# Patient Record
Sex: FEMALE | Race: WHITE | NOT HISPANIC OR LATINO | URBAN - METROPOLITAN AREA
[De-identification: names, ages, dates, MRNs, and addresses within clinical notes are randomized per-mention and may not be internally consistent; named-entity substitution may affect disease eponyms.]

---

## 2017-06-04 ENCOUNTER — EMERGENCY (EMERGENCY)
Facility: HOSPITAL | Age: 22
LOS: 0 days | Discharge: HOME | End: 2017-06-04

## 2017-06-28 DIAGNOSIS — Z79.899 OTHER LONG TERM (CURRENT) DRUG THERAPY: ICD-10-CM

## 2017-06-28 DIAGNOSIS — F41.8 OTHER SPECIFIED ANXIETY DISORDERS: ICD-10-CM

## 2017-06-28 DIAGNOSIS — Z76.0 ENCOUNTER FOR ISSUE OF REPEAT PRESCRIPTION: ICD-10-CM

## 2018-07-25 ENCOUNTER — EMERGENCY (EMERGENCY)
Facility: HOSPITAL | Age: 23
LOS: 0 days | Discharge: HOME | End: 2018-07-25
Attending: EMERGENCY MEDICINE | Admitting: EMERGENCY MEDICINE

## 2018-07-25 VITALS
TEMPERATURE: 97 F | DIASTOLIC BLOOD PRESSURE: 60 MMHG | HEART RATE: 88 BPM | RESPIRATION RATE: 19 BRPM | SYSTOLIC BLOOD PRESSURE: 122 MMHG | OXYGEN SATURATION: 99 %

## 2018-07-25 VITALS
SYSTOLIC BLOOD PRESSURE: 149 MMHG | OXYGEN SATURATION: 99 % | TEMPERATURE: 98 F | HEART RATE: 87 BPM | DIASTOLIC BLOOD PRESSURE: 67 MMHG | RESPIRATION RATE: 18 BRPM

## 2018-07-25 DIAGNOSIS — Z79.899 OTHER LONG TERM (CURRENT) DRUG THERAPY: ICD-10-CM

## 2018-07-25 DIAGNOSIS — R00.2 PALPITATIONS: ICD-10-CM

## 2018-07-25 DIAGNOSIS — R20.2 PARESTHESIA OF SKIN: ICD-10-CM

## 2018-07-25 DIAGNOSIS — F32.9 MAJOR DEPRESSIVE DISORDER, SINGLE EPISODE, UNSPECIFIED: ICD-10-CM

## 2018-07-25 DIAGNOSIS — R06.02 SHORTNESS OF BREATH: ICD-10-CM

## 2018-07-25 LAB
ANION GAP SERPL CALC-SCNC: 15 MMOL/L — HIGH (ref 7–14)
BUN SERPL-MCNC: 8 MG/DL — LOW (ref 10–20)
CALCIUM SERPL-MCNC: 9.6 MG/DL — SIGNIFICANT CHANGE UP (ref 8.5–10.1)
CHLORIDE SERPL-SCNC: 100 MMOL/L — SIGNIFICANT CHANGE UP (ref 98–110)
CO2 SERPL-SCNC: 24 MMOL/L — SIGNIFICANT CHANGE UP (ref 17–32)
CREAT SERPL-MCNC: 0.6 MG/DL — LOW (ref 0.7–1.5)
D DIMER BLD IA.RAPID-MCNC: 207 NG/ML DDU — SIGNIFICANT CHANGE UP (ref 0–230)
GLUCOSE SERPL-MCNC: 102 MG/DL — HIGH (ref 70–99)
HCT VFR BLD CALC: 39.9 % — SIGNIFICANT CHANGE UP (ref 37–47)
HGB BLD-MCNC: 12.7 G/DL — SIGNIFICANT CHANGE UP (ref 12–16)
MAGNESIUM SERPL-MCNC: 2.1 MG/DL — SIGNIFICANT CHANGE UP (ref 1.8–2.4)
MCHC RBC-ENTMCNC: 26.8 PG — LOW (ref 27–31)
MCHC RBC-ENTMCNC: 31.8 G/DL — LOW (ref 32–37)
MCV RBC AUTO: 84.2 FL — SIGNIFICANT CHANGE UP (ref 81–99)
NRBC # BLD: 0 /100 WBCS — SIGNIFICANT CHANGE UP (ref 0–0)
PLATELET # BLD AUTO: 333 K/UL — SIGNIFICANT CHANGE UP (ref 130–400)
POTASSIUM SERPL-MCNC: 4.3 MMOL/L — SIGNIFICANT CHANGE UP (ref 3.5–5)
POTASSIUM SERPL-SCNC: 4.3 MMOL/L — SIGNIFICANT CHANGE UP (ref 3.5–5)
RBC # BLD: 4.74 M/UL — SIGNIFICANT CHANGE UP (ref 4.2–5.4)
RBC # FLD: 13.5 % — SIGNIFICANT CHANGE UP (ref 11.5–14.5)
SODIUM SERPL-SCNC: 139 MMOL/L — SIGNIFICANT CHANGE UP (ref 135–146)
WBC # BLD: 8.5 K/UL — SIGNIFICANT CHANGE UP (ref 4.8–10.8)
WBC # FLD AUTO: 8.5 K/UL — SIGNIFICANT CHANGE UP (ref 4.8–10.8)

## 2018-07-25 RX ADMIN — Medication 60 MILLIGRAM(S): at 18:46

## 2018-07-25 NOTE — ED PROVIDER NOTE - NS ED ROS FT
Review of Systems:  	•	CONSTITUTIONAL - no fever, no diaphoresis, no chills  	•	SKIN - no rash  	  	•	EYES - no eye pain, no blurry vision    	•	RESPIRATORY - shortness of breath, no cough  	•	CARDIAC - no chest pain, no palpitations  	•	GI - no abd pain, no nausea, no vomiting, no diarrhea, no constipation  	•  	•	MUSCULOSKELETAL - no joint paint, no swelling, no redness  	•	NEUROLOGIC - no weakness, no headache, no paresthesias, no LOC  	•	PSYCH - anxiety, non suicidal, non homicidal, no hallucination, no depression

## 2018-07-25 NOTE — ED ADULT NURSE NOTE - FALL HARM RISK CONCLUSION
I will see this child at 3:20 PM right after the heil appointment  
Patient is running a slight fever and mom said there was a little girl at  that was diagnosed with influenza A last week.   Mom would like call back to see if patient could be seen today by Dr. Del Real - she believes patient has influenza.   
There are no openings today.  How do you want to proceed.   
Universal Safety Interventions

## 2018-07-25 NOTE — ED PROVIDER NOTE - ATTENDING CONTRIBUTION TO CARE
21 yo F h/o PCOS, anxiety presents with c/o feeling tingling of her lips, tightening of her throat after taking a Klonopin tonight.  Pt also has been feeling SOB and intermittent palpitations for the last few days, Started OCPs 2 weeks ago.  no CP, no abd pain.  On exam pt in NAD AAo x 3, OP clear, no lip or tongue swelling, no edema, no calf tenderness, Lungs cTA B/L no wrr, abd is soft nt nd

## 2018-07-25 NOTE — ED PROVIDER NOTE - PHYSICAL EXAMINATION
--EXAM--  VITAL SIGNS: I have reviewed vs documented at present.  CONSTITUTIONAL: Well-developed; well-nourished; in no acute distress.   SKIN: Warm and dry, no acute rash.     CARD: S1, S2, Regular rate and rhythm.   RESP: No wheezes, rales or rhonchi.  ABD: Normal bowel sounds; soft; non-distended; non-tender.  EXT: Normal ROM.   NEURO: Alert, oriented, grossly unremarkable. Strength 5/5 in all extremities. Sensation intact throughout.  PSYCH: Cooperative, appropriate.

## 2018-07-25 NOTE — ED ADULT TRIAGE NOTE - CHIEF COMPLAINT QUOTE
Per patient, "I took a Klonopin before and it feels like it si stuck in my throat. I have taken them in the past with no problem"

## 2018-11-28 ENCOUNTER — EMERGENCY (EMERGENCY)
Facility: HOSPITAL | Age: 23
LOS: 0 days | Discharge: HOME | End: 2018-11-28
Attending: EMERGENCY MEDICINE | Admitting: EMERGENCY MEDICINE

## 2018-11-28 VITALS
SYSTOLIC BLOOD PRESSURE: 146 MMHG | DIASTOLIC BLOOD PRESSURE: 83 MMHG | RESPIRATION RATE: 18 BRPM | WEIGHT: 293 LBS | TEMPERATURE: 98 F | HEART RATE: 116 BPM | OXYGEN SATURATION: 98 %

## 2018-11-28 DIAGNOSIS — F41.8 OTHER SPECIFIED ANXIETY DISORDERS: ICD-10-CM

## 2018-11-28 DIAGNOSIS — Z88.8 ALLERGY STATUS TO OTHER DRUGS, MEDICAMENTS AND BIOLOGICAL SUBSTANCES: ICD-10-CM

## 2018-11-28 DIAGNOSIS — Z79.899 OTHER LONG TERM (CURRENT) DRUG THERAPY: ICD-10-CM

## 2018-11-28 RX ORDER — ESCITALOPRAM OXALATE 10 MG/1
0 TABLET, FILM COATED ORAL
Qty: 0 | Refills: 0 | COMMUNITY

## 2018-11-28 RX ORDER — CLONAZEPAM 1 MG
0 TABLET ORAL
Qty: 0 | Refills: 0 | COMMUNITY

## 2018-11-28 RX ORDER — BUPROPION HYDROCHLORIDE 150 MG/1
0 TABLET, EXTENDED RELEASE ORAL
Qty: 0 | Refills: 0 | COMMUNITY

## 2018-11-28 NOTE — ED PROVIDER NOTE - NS ED ROS FT
Review of Systems:  	•	CONSTITUTIONAL - no fever, no diaphoresis, no chills  	•	SKIN - no rash  	•	HEMATOLOGIC - no bleeding, no bruising  	•	EYES - no eye pain, no blurry vision  	•	ENT - no congestion  	•	RESPIRATORY - no shortness of breath, no cough  	•	CARDIAC - no chest pain, no palpitations  	•	GI - no abd pain, no nausea, no vomiting  	•	NEUROLOGIC - no weakness, no headache, no paresthesias, no LOC  	•	PSYCH - +anxiety, non suicidal, non homicidal, no hallucination, +depression  	All other ROS are negative except as documented in HPI.

## 2018-11-28 NOTE — ED PROVIDER NOTE - PHYSICAL EXAMINATION
VITAL SIGNS: I have reviewed nursing notes and confirm.  CONSTITUTIONAL: Well-developed; well-nourished; in no acute distress.  SKIN: Skin exam is warm and dry, no acute rash.  HEAD: Normocephalic; atraumatic.  EYES: PERRL, EOM intact; conjunctiva and sclera clear.  ENT: No nasal discharge; airway clear.   NECK: Supple; non tender.  CARD: S1, S2 normal; no murmurs, gallops, or rubs. Regular rate and rhythm.  RESP: Clear to auscultation bilaterally. No wheezes, rales or rhonchi.  ABD: Normal bowel sounds; soft; non-distended; non-tender.   EXT: Normal ROM.   NEURO: Alert, oriented. Grossly unremarkable. No focal deficits.  PSYCH: Cooperative, appropriate.

## 2018-11-28 NOTE — ED PROVIDER NOTE - ATTENDING CONTRIBUTION TO CARE
24yo F presents requesting to be seen by psych. Pt states that she was seen by a new psychiatrist 2 weeks ago and was diagnosed with PTSD and Bipolar at which time she was started on 3 new medications. Pt states she feels like she is experiencing some side effects. Pt denies any CP, SOB, fever, chills, nausea, vomiting, SI/HI. On exam: NCAT. PERRLA, EOMI. OP clear. Lungs CTAB. RRR, S1S2 noted. Radial pulses 2+ and equal, pedal pulses 2+ and equal. Abdomen soft, NT/ND, no rebound or guarding. FROM x4 extremities. No focal neuro deficits. will consult with psych she is not homicidal or sucicidal

## 2018-11-28 NOTE — ED PROVIDER NOTE - OBJECTIVE STATEMENT
24 yo F with pmhx of depression, anxiety presenting for psych evaluation. Pt states she was seen by a new psychiatrist 2 weeks ago and was newly diagnosed with PTSD and bipolar disorder and started her on 3 new meds 2 wks ago in which she thinks are giving her side effects. No homicidal or suicidal ideations.

## 2018-11-28 NOTE — ED PROVIDER NOTE - MEDICAL DECISION MAKING DETAILS
22yo F presents requesting to be seen by psych. Pt states that she was seen by a new psychiatrist 2 weeks ago and was diagnosed with PTSD and Bipolar at which time she was started on 3 new medications. Pt states she feels like she is experiencing some side effects. Pt denies any CP, SOB, fever, chills, nausea, vomiting, SI/HI. On exam: NCAT. PERRLA, EOMI. OP clear. Lungs CTAB. RRR, S1S2 noted. Radial pulses 2+ and equal, pedal pulses 2+ and equal. Abdomen soft, NT/ND, no rebound or guarding. FROM x4 extremities. No focal neuro deficits. will consult with psych 22yo F presents requesting to be seen by psych. Pt states that she was seen by a new psychiatrist 2 weeks ago and was diagnosed with PTSD and Bipolar at which time she was started on 3 new medications. Pt states she feels like she is experiencing some side effects. Pt denies any CP, SOB, fever, chills, nausea, vomiting, SI/HI. On exam: NCAT. PERRLA, EOMI. OP clear. Lungs CTAB. RRR, S1S2 noted. Radial pulses 2+ and equal, pedal pulses 2+ and equal. Abdomen soft, NT/ND, no rebound or guarding. FROM x4 extremities. No focal neuro deficits. will consult with psych she is not homicidal or sucicidal

## 2018-11-28 NOTE — ED PROVIDER NOTE - NSFOLLOWUPINSTRUCTIONS_ED_ALL_ED_FT
Depression    Depression is a mental illness that usually causes feelings of sadness, hopelessness, or helplessness. Some people with this disorder do not feel particularly sad but lose interest in doing things they used to enjoy (anhedonia). Major depressive disorder also can cause physical symptoms. It can interfere with work, school, relationships, and other normal everyday activities. If you were started on a medication make sure to take exactly as prescribed and follow up with a psychiatrist.    SEEK IMMEDIATE MEDICAL CARE IF YOU HAVE THE FOLLOWING SYMPTOMS: thoughts about hurting killing yourself, thoughts about hurting or killing somebody else, hallucinations, or worsening depression.

## 2018-11-28 NOTE — ED ADULT NURSE NOTE - NSIMPLEMENTINTERV_GEN_ALL_ED
Implemented All Universal Safety Interventions:  [place] to call system. Call bell, personal items and telephone within reach. Instruct patient to call for assistance. Room bathroom lighting operational. Non-slip footwear when patient is off stretcher. Physically safe environment: no spills, clutter or unnecessary equipment. Stretcher in lowest position, wheels locked, appropriate side rails in place.

## 2018-11-28 NOTE — ED BEHAVIORAL HEALTH NOTE - BEHAVIORAL HEALTH NOTE
Pt is 23 yrs old single W/F, student at [employer], came to the ED requesting psychiatric evaluation after she was diagnosed to had Bipolar Disorder and PTSD and was prescribed, Ziprasidone 20 mg BID Clonidine 0.1 mg BID, and Seroquel 25 mg at HS. She has history of two psychiatric hospitalization when she was [age] yrs old; one at [facility] for depression and cutting herself and at [facility] for severe depression and suicidal behavior. She has been going to [facility] services where she sees a Psychiatrist and therapist weekly.  She is complaining that she has been feeling groggy, has difficulty of concentrating and her anxiety had increased since she started taking the combination of Ziprasidone, Clonidine, and Seroquel.   She has no history of alcohol or drug abuse.  She is allergic to Klonopin,, denies medical problem; LMP was two weeks ago. Her father is alcoholic and probably has depression and a Mater Uncle has history of drug abuse.  She denies any legal problem; presently reside with parents and sibling.   MSE: Pt is 23 yrs old W/F looks stated age, appropriately groomed and dressed, cooperative with good eye contact. She was pleasant and friendly; affect is appropriate. Speech is normal rate and volume, coherent and goal-directed. No delusion or a/v hallucination. Not suicidal or homicidal. Alert, oriented x 3 with good memory. Judgment and insight not impaired.     Dx: Bipolar Affective Disorder        PTSD  Plan: Pt report side effects of combination or present meds; Ziprasidone, Clonidine and Seroquel.          Ziprasidone 20 mg daily          She was advise to take Vistaril 50 mg which she has at home for her anxiety and sleeping difficulties.           D/C Seroquel as she claim it makes her groggy  with low dose of 25 mg at HS          F/U with [facility]. She claim she has an appt with her therapist this Friday.

## 2018-11-28 NOTE — ED PROVIDER NOTE - NSFOLLOWUPCLINICS_GEN_ALL_ED_FT
[facility]  [facility]  [address] [place] [id number]  Phone: [phone]  Fax:   Follow Up Time: 1-3 Days

## 2018-11-29 PROBLEM — F32.9 MAJOR DEPRESSIVE DISORDER, SINGLE EPISODE, UNSPECIFIED: Chronic | Status: ACTIVE | Noted: 2018-07-25

## 2019-04-03 NOTE — ED ADULT NURSE NOTE - NS ED BHA BENZODIAZEPINES
We want to give the best care possible. If you receive a Press Ganey survey from our office, please take the time to fill out the survey and return it in the envelope provided. Your feedback helps us know how we are doing and we really appreciate it.Thank you.    
None known

## 2020-09-17 ENCOUNTER — RX RENEWAL (OUTPATIENT)
Age: 25
End: 2020-09-17

## 2020-09-28 ENCOUNTER — EMERGENCY (EMERGENCY)
Facility: HOSPITAL | Age: 25
LOS: 0 days | Discharge: HOME | End: 2020-09-28
Attending: EMERGENCY MEDICINE | Admitting: EMERGENCY MEDICINE
Payer: COMMERCIAL

## 2020-09-28 VITALS
HEIGHT: 66 IN | WEIGHT: 293 LBS | DIASTOLIC BLOOD PRESSURE: 75 MMHG | TEMPERATURE: 98 F | OXYGEN SATURATION: 100 % | RESPIRATION RATE: 18 BRPM | HEART RATE: 83 BPM | SYSTOLIC BLOOD PRESSURE: 127 MMHG

## 2020-09-28 VITALS
DIASTOLIC BLOOD PRESSURE: 64 MMHG | TEMPERATURE: 98 F | HEART RATE: 71 BPM | OXYGEN SATURATION: 99 % | SYSTOLIC BLOOD PRESSURE: 131 MMHG | RESPIRATION RATE: 17 BRPM

## 2020-09-28 DIAGNOSIS — Z79.899 OTHER LONG TERM (CURRENT) DRUG THERAPY: ICD-10-CM

## 2020-09-28 DIAGNOSIS — R10.9 UNSPECIFIED ABDOMINAL PAIN: ICD-10-CM

## 2020-09-28 DIAGNOSIS — Z88.8 ALLERGY STATUS TO OTHER DRUGS, MEDICAMENTS AND BIOLOGICAL SUBSTANCES: ICD-10-CM

## 2020-09-28 DIAGNOSIS — R11.2 NAUSEA WITH VOMITING, UNSPECIFIED: ICD-10-CM

## 2020-09-28 DIAGNOSIS — F32.9 MAJOR DEPRESSIVE DISORDER, SINGLE EPISODE, UNSPECIFIED: ICD-10-CM

## 2020-09-28 DIAGNOSIS — R19.7 DIARRHEA, UNSPECIFIED: ICD-10-CM

## 2020-09-28 DIAGNOSIS — R06.02 SHORTNESS OF BREATH: ICD-10-CM

## 2020-09-28 LAB
ALBUMIN SERPL ELPH-MCNC: 4.3 G/DL — SIGNIFICANT CHANGE UP (ref 3.5–5.2)
ALP SERPL-CCNC: 94 U/L — SIGNIFICANT CHANGE UP (ref 30–115)
ALT FLD-CCNC: 28 U/L — SIGNIFICANT CHANGE UP (ref 0–41)
ANION GAP SERPL CALC-SCNC: 11 MMOL/L — SIGNIFICANT CHANGE UP (ref 7–14)
AST SERPL-CCNC: 30 U/L — SIGNIFICANT CHANGE UP (ref 0–41)
BASOPHILS # BLD AUTO: 0.04 K/UL — SIGNIFICANT CHANGE UP (ref 0–0.2)
BASOPHILS NFR BLD AUTO: 0.5 % — SIGNIFICANT CHANGE UP (ref 0–1)
BILIRUB SERPL-MCNC: 0.4 MG/DL — SIGNIFICANT CHANGE UP (ref 0.2–1.2)
BUN SERPL-MCNC: 11 MG/DL — SIGNIFICANT CHANGE UP (ref 10–20)
CALCIUM SERPL-MCNC: 9.4 MG/DL — SIGNIFICANT CHANGE UP (ref 8.5–10.1)
CHLORIDE SERPL-SCNC: 103 MMOL/L — SIGNIFICANT CHANGE UP (ref 98–110)
CO2 SERPL-SCNC: 27 MMOL/L — SIGNIFICANT CHANGE UP (ref 17–32)
CREAT SERPL-MCNC: 0.6 MG/DL — LOW (ref 0.7–1.5)
EOSINOPHIL # BLD AUTO: 0.15 K/UL — SIGNIFICANT CHANGE UP (ref 0–0.7)
EOSINOPHIL NFR BLD AUTO: 2.1 % — SIGNIFICANT CHANGE UP (ref 0–8)
GLUCOSE SERPL-MCNC: 123 MG/DL — HIGH (ref 70–99)
HCT VFR BLD CALC: 40.1 % — SIGNIFICANT CHANGE UP (ref 37–47)
HGB BLD-MCNC: 12.3 G/DL — SIGNIFICANT CHANGE UP (ref 12–16)
IMM GRANULOCYTES NFR BLD AUTO: 0.3 % — SIGNIFICANT CHANGE UP (ref 0.1–0.3)
LIDOCAIN IGE QN: 22 U/L — SIGNIFICANT CHANGE UP (ref 7–60)
LYMPHOCYTES # BLD AUTO: 1.99 K/UL — SIGNIFICANT CHANGE UP (ref 1.2–3.4)
LYMPHOCYTES # BLD AUTO: 27.3 % — SIGNIFICANT CHANGE UP (ref 20.5–51.1)
MCHC RBC-ENTMCNC: 26.7 PG — LOW (ref 27–31)
MCHC RBC-ENTMCNC: 30.7 G/DL — LOW (ref 32–37)
MCV RBC AUTO: 87 FL — SIGNIFICANT CHANGE UP (ref 81–99)
MONOCYTES # BLD AUTO: 0.5 K/UL — SIGNIFICANT CHANGE UP (ref 0.1–0.6)
MONOCYTES NFR BLD AUTO: 6.8 % — SIGNIFICANT CHANGE UP (ref 1.7–9.3)
NEUTROPHILS # BLD AUTO: 4.6 K/UL — SIGNIFICANT CHANGE UP (ref 1.4–6.5)
NEUTROPHILS NFR BLD AUTO: 63 % — SIGNIFICANT CHANGE UP (ref 42.2–75.2)
NRBC # BLD: 0 /100 WBCS — SIGNIFICANT CHANGE UP (ref 0–0)
PLATELET # BLD AUTO: 286 K/UL — SIGNIFICANT CHANGE UP (ref 130–400)
POTASSIUM SERPL-MCNC: 4.4 MMOL/L — SIGNIFICANT CHANGE UP (ref 3.5–5)
POTASSIUM SERPL-SCNC: 4.4 MMOL/L — SIGNIFICANT CHANGE UP (ref 3.5–5)
PROT SERPL-MCNC: 7 G/DL — SIGNIFICANT CHANGE UP (ref 6–8)
RBC # BLD: 4.61 M/UL — SIGNIFICANT CHANGE UP (ref 4.2–5.4)
RBC # FLD: 13.6 % — SIGNIFICANT CHANGE UP (ref 11.5–14.5)
SODIUM SERPL-SCNC: 141 MMOL/L — SIGNIFICANT CHANGE UP (ref 135–146)
WBC # BLD: 7.3 K/UL — SIGNIFICANT CHANGE UP (ref 4.8–10.8)
WBC # FLD AUTO: 7.3 K/UL — SIGNIFICANT CHANGE UP (ref 4.8–10.8)

## 2020-09-28 PROCEDURE — 76705 ECHO EXAM OF ABDOMEN: CPT | Mod: 26

## 2020-09-28 PROCEDURE — 99285 EMERGENCY DEPT VISIT HI MDM: CPT

## 2020-09-28 PROCEDURE — 71046 X-RAY EXAM CHEST 2 VIEWS: CPT | Mod: 26

## 2020-09-28 RX ORDER — ESCITALOPRAM OXALATE 10 MG/1
0 TABLET, FILM COATED ORAL
Qty: 0 | Refills: 0 | DISCHARGE

## 2020-09-28 RX ORDER — SODIUM CHLORIDE 9 MG/ML
1000 INJECTION, SOLUTION INTRAVENOUS ONCE
Refills: 0 | Status: COMPLETED | OUTPATIENT
Start: 2020-09-28 | End: 2020-09-28

## 2020-09-28 RX ORDER — QUETIAPINE FUMARATE 200 MG/1
0 TABLET, FILM COATED ORAL
Qty: 0 | Refills: 0 | DISCHARGE

## 2020-09-28 RX ORDER — TRAZODONE HCL 50 MG
0 TABLET ORAL
Qty: 0 | Refills: 0 | DISCHARGE

## 2020-09-28 RX ORDER — LAMOTRIGINE 25 MG/1
0 TABLET, ORALLY DISINTEGRATING ORAL
Qty: 0 | Refills: 0 | DISCHARGE

## 2020-09-28 RX ORDER — DIPHENHYDRAMINE HYDROCHLORIDE AND LIDOCAINE HYDROCHLORIDE AND ALUMINUM HYDROXIDE AND MAGNESIUM HYDRO
30 KIT ONCE
Refills: 0 | Status: COMPLETED | OUTPATIENT
Start: 2020-09-28 | End: 2020-09-28

## 2020-09-28 RX ORDER — ONDANSETRON 8 MG/1
1 TABLET, FILM COATED ORAL
Qty: 9 | Refills: 0
Start: 2020-09-28 | End: 2020-09-30

## 2020-09-28 RX ORDER — ONDANSETRON 8 MG/1
4 TABLET, FILM COATED ORAL ONCE
Refills: 0 | Status: COMPLETED | OUTPATIENT
Start: 2020-09-28 | End: 2020-09-28

## 2020-09-28 RX ORDER — ZIPRASIDONE HYDROCHLORIDE 20 MG/1
1 CAPSULE ORAL
Qty: 0 | Refills: 0 | DISCHARGE

## 2020-09-28 RX ADMIN — SODIUM CHLORIDE 1000 MILLILITER(S): 9 INJECTION, SOLUTION INTRAVENOUS at 14:01

## 2020-09-28 RX ADMIN — SODIUM CHLORIDE 1000 MILLILITER(S): 9 INJECTION, SOLUTION INTRAVENOUS at 11:50

## 2020-09-28 RX ADMIN — ONDANSETRON 4 MILLIGRAM(S): 8 TABLET, FILM COATED ORAL at 11:39

## 2020-09-28 RX ADMIN — DIPHENHYDRAMINE HYDROCHLORIDE AND LIDOCAINE HYDROCHLORIDE AND ALUMINUM HYDROXIDE AND MAGNESIUM HYDRO 30 MILLILITER(S): KIT at 11:39

## 2020-09-28 RX ADMIN — Medication 20 MILLIGRAM(S): at 14:09

## 2020-09-28 NOTE — ED PROVIDER NOTE - PHYSICAL EXAMINATION
Vital Signs: I have reviewed the initial vital signs.  Constitutional: well-nourished, appears stated age, no acute distress  Cardiovascular: regular rate, regular rhythm, well-perfused extremities  Respiratory: unlabored respiratory effort, clear to auscultation bilaterally  Gastrointestinal: soft, (+) epigastric region ttp. No CVA tenderness.    Musculoskeletal: supple neck, no lower extremity edema  Integumentary: warm, dry, no rash  Neurologic: awake, alert, extremities’ motor and sensory functions grossly intact  Psychiatric: appropriate mood, appropriate affect

## 2020-09-28 NOTE — ED PROVIDER NOTE - CLINICAL SUMMARY MEDICAL DECISION MAKING FREE TEXT BOX
24y female no significant PMH currently menstruating c/o crampy upper abdominal pain with n/v and 1-2 episodes watery nb stool, no f/c, no recent abx use, concerned as she was participating in chicken rescue in [place] these last 2 days where she was amidst dead chickens/feces/blood, was in PPE, on exam vital signs appreciated, well appearing conj pink mmm neck supple cor reg lungs cta abd +bs, soft with mild epi ttp no g/r, pt with initial relief then return of pain prompting sono, unremarkable, pt feeling better, abd snt, po tolerant, will d/c to f/u with PMD, aware of limitations of todays workup. Patient counseled regarding conditions which should prompt return.

## 2020-09-28 NOTE — ED PROVIDER NOTE - NSFOLLOWUPCLINICS_GEN_ALL_ED_FT
[facility]  Medicine  [address], [place]   Phone: [phone]  Fax:   Follow Up Time: 1-3 Days

## 2020-09-28 NOTE — ED PROVIDER NOTE - NS ED ROS FT
Constitutional: (-) fever  Eyes/ENT: (-) blurry vision, (-) epistaxis  Cardiovascular: (-) chest pain, (-) syncope  Respiratory: (-) cough, (+) shortness of breath  Gastrointestinal: (+) vomiting, (+) epigastric pain, (-) diarrhea  Musculoskeletal: (-) neck pain, (-) back pain, (-) joint pain  Integumentary: (-) rash, (-) edema  Neurological: (-) headache, (-) altered mental status  Psychiatric: (-) hallucinations  Allergic/Immunologic: (-) pruritus

## 2020-09-28 NOTE — ED PROVIDER NOTE - OBJECTIVE STATEMENT
The pt is a 24y F w/ hx of obesity presenting with N/V that started this morning. Pt was just on a work excursion in [place] and awake for 48 hours, felt fine throughout. When she woke up this morning she had breakfast and shortly after had 1 episode of NBNB emesis. After emesis, felt sharp pain in the epigastric region associated with mild SOB. No sick contacts, other recent travel. Afebrile. Denies headache, chest pain, diarrhea, dysuria/hematuria. Menstrual cycles are not regular. She says she is currently on her period and it has been present for about 1 month. Prior to this, she hadn't had a period in several months.

## 2020-09-28 NOTE — ED PROVIDER NOTE - PATIENT PORTAL LINK FT
You can access the FollowMyHealth Patient Portal offered by [facility] by registering at the following website: [email or web address]. By joining [facility]’s FollowMyHealth portal, you will also be able to view your health information using other applications (apps) compatible with our system.

## 2020-09-28 NOTE — ED PROVIDER NOTE - ATTENDING CONTRIBUTION TO CARE
24y female no significant PMH currently menstruating c/o crampy upper abdominal pain with n/v and 1-2 episodes watery nb stool, no f/c, no recent abx use, concerned as she was participating in chicken rescue in [place] these last 2 days where she was amidst dead chickens/feces/blood, was in PPE, on exam vital signs appreciated, well appearing conj pink mmm neck supple cor reg lungs cta abd +bs, soft with mild epi ttp no g/r, pt with initial relief then return of pain prompting sono, unremarkable, pt feeling better, abd snt, po tolerant, will d/c to f/u with PMD, aware of limitations of todays workup. Patient counseled regarding conditions which should prompt return.

## 2022-05-04 ENCOUNTER — NURSE TRIAGE (OUTPATIENT)
Dept: ADMINISTRATIVE | Facility: CLINIC | Age: 27
End: 2022-05-04

## 2022-05-04 PROCEDURE — 81025 URINE PREGNANCY TEST: CPT | Performed by: EMERGENCY MEDICINE

## 2022-05-04 PROCEDURE — 99285 EMERGENCY DEPT VISIT HI MDM: CPT | Mod: 25

## 2022-05-05 ENCOUNTER — HOSPITAL ENCOUNTER (EMERGENCY)
Facility: OTHER | Age: 27
Discharge: HOME | End: 2022-05-05
Attending: EMERGENCY MEDICINE
Payer: MEDICAID

## 2022-05-05 VITALS
OXYGEN SATURATION: 100 % | TEMPERATURE: 98 F | HEART RATE: 85 BPM | RESPIRATION RATE: 17 BRPM | WEIGHT: 240 LBS | DIASTOLIC BLOOD PRESSURE: 69 MMHG | SYSTOLIC BLOOD PRESSURE: 134 MMHG

## 2022-05-05 DIAGNOSIS — R10.11 ACUTE BILATERAL UPPER ABDOMINAL PAIN: ICD-10-CM

## 2022-05-05 DIAGNOSIS — R10.12 ACUTE BILATERAL UPPER ABDOMINAL PAIN: ICD-10-CM

## 2022-05-05 DIAGNOSIS — K59.00 CONSTIPATION, UNSPECIFIED CONSTIPATION TYPE: Primary | ICD-10-CM

## 2022-05-05 LAB
ALBUMIN SERPL BCP-MCNC: 4 G/DL (ref 3.5–5.2)
ALP SERPL-CCNC: 78 U/L (ref 55–135)
ALT SERPL W/O P-5'-P-CCNC: 18 U/L (ref 10–44)
ANION GAP SERPL CALC-SCNC: 12 MMOL/L (ref 8–16)
AST SERPL-CCNC: 17 U/L (ref 10–40)
B-HCG UR QL: NEGATIVE
BASOPHILS # BLD AUTO: 0.03 K/UL (ref 0–0.2)
BASOPHILS NFR BLD: 0.3 % (ref 0–1.9)
BILIRUB SERPL-MCNC: 0.4 MG/DL (ref 0.1–1)
BILIRUB UR QL STRIP: NEGATIVE
BUN SERPL-MCNC: 13 MG/DL (ref 6–20)
CALCIUM SERPL-MCNC: 9.1 MG/DL (ref 8.7–10.5)
CHLORIDE SERPL-SCNC: 99 MMOL/L (ref 95–110)
CLARITY UR: CLEAR
CO2 SERPL-SCNC: 24 MMOL/L (ref 23–29)
COLOR UR: YELLOW
CREAT SERPL-MCNC: 0.7 MG/DL (ref 0.5–1.4)
CTP QC/QA: YES
DIFFERENTIAL METHOD: ABNORMAL
EOSINOPHIL # BLD AUTO: 0 K/UL (ref 0–0.5)
EOSINOPHIL NFR BLD: 0.2 % (ref 0–8)
ERYTHROCYTE [DISTWIDTH] IN BLOOD BY AUTOMATED COUNT: 14.4 % (ref 11.5–14.5)
EST. GFR  (AFRICAN AMERICAN): >60 ML/MIN/1.73 M^2
EST. GFR  (NON AFRICAN AMERICAN): >60 ML/MIN/1.73 M^2
GLUCOSE SERPL-MCNC: 137 MG/DL (ref 70–110)
GLUCOSE UR QL STRIP: NEGATIVE
HCT VFR BLD AUTO: 40.1 % (ref 37–48.5)
HCV AB SERPL QL IA: NEGATIVE
HGB BLD-MCNC: 12.5 G/DL (ref 12–16)
HGB UR QL STRIP: NEGATIVE
HIV 1+2 AB+HIV1 P24 AG SERPL QL IA: NEGATIVE
IMM GRANULOCYTES # BLD AUTO: 0.03 K/UL (ref 0–0.04)
IMM GRANULOCYTES NFR BLD AUTO: 0.3 % (ref 0–0.5)
KETONES UR QL STRIP: NEGATIVE
LEUKOCYTE ESTERASE UR QL STRIP: NEGATIVE
LIPASE SERPL-CCNC: 9 U/L (ref 4–60)
LYMPHOCYTES # BLD AUTO: 1 K/UL (ref 1–4.8)
LYMPHOCYTES NFR BLD: 10.6 % (ref 18–48)
MCH RBC QN AUTO: 25.8 PG (ref 27–31)
MCHC RBC AUTO-ENTMCNC: 31.2 G/DL (ref 32–36)
MCV RBC AUTO: 83 FL (ref 82–98)
MONOCYTES # BLD AUTO: 0.4 K/UL (ref 0.3–1)
MONOCYTES NFR BLD: 4 % (ref 4–15)
NEUTROPHILS # BLD AUTO: 7.7 K/UL (ref 1.8–7.7)
NEUTROPHILS NFR BLD: 84.6 % (ref 38–73)
NITRITE UR QL STRIP: NEGATIVE
NRBC BLD-RTO: 0 /100 WBC
PH UR STRIP: 7 [PH] (ref 5–8)
PLATELET # BLD AUTO: 297 K/UL (ref 150–450)
PMV BLD AUTO: 11 FL (ref 9.2–12.9)
POTASSIUM SERPL-SCNC: 4.1 MMOL/L (ref 3.5–5.1)
PROT SERPL-MCNC: 7.1 G/DL (ref 6–8.4)
PROT UR QL STRIP: NEGATIVE
RBC # BLD AUTO: 4.85 M/UL (ref 4–5.4)
SODIUM SERPL-SCNC: 135 MMOL/L (ref 136–145)
SP GR UR STRIP: 1.02 (ref 1–1.03)
URN SPEC COLLECT METH UR: NORMAL
UROBILINOGEN UR STRIP-ACNC: NEGATIVE EU/DL
WBC # BLD AUTO: 9.08 K/UL (ref 3.9–12.7)

## 2022-05-05 PROCEDURE — 87389 HIV-1 AG W/HIV-1&-2 AB AG IA: CPT | Performed by: EMERGENCY MEDICINE

## 2022-05-05 PROCEDURE — 96375 TX/PRO/DX INJ NEW DRUG ADDON: CPT

## 2022-05-05 PROCEDURE — 83690 ASSAY OF LIPASE: CPT | Performed by: EMERGENCY MEDICINE

## 2022-05-05 PROCEDURE — 85025 COMPLETE CBC W/AUTO DIFF WBC: CPT | Performed by: EMERGENCY MEDICINE

## 2022-05-05 PROCEDURE — 86803 HEPATITIS C AB TEST: CPT | Performed by: EMERGENCY MEDICINE

## 2022-05-05 PROCEDURE — 96361 HYDRATE IV INFUSION ADD-ON: CPT

## 2022-05-05 PROCEDURE — 25000003 PHARM REV CODE 250: Performed by: EMERGENCY MEDICINE

## 2022-05-05 PROCEDURE — 96374 THER/PROPH/DIAG INJ IV PUSH: CPT | Mod: 59

## 2022-05-05 PROCEDURE — 25500020 PHARM REV CODE 255: Performed by: EMERGENCY MEDICINE

## 2022-05-05 PROCEDURE — 80053 COMPREHEN METABOLIC PANEL: CPT | Performed by: EMERGENCY MEDICINE

## 2022-05-05 PROCEDURE — 81003 URINALYSIS AUTO W/O SCOPE: CPT | Performed by: EMERGENCY MEDICINE

## 2022-05-05 PROCEDURE — 63600175 PHARM REV CODE 636 W HCPCS: Performed by: EMERGENCY MEDICINE

## 2022-05-05 RX ORDER — HYDROMORPHONE HYDROCHLORIDE 1 MG/ML
0.5 INJECTION, SOLUTION INTRAMUSCULAR; INTRAVENOUS; SUBCUTANEOUS
Status: COMPLETED | OUTPATIENT
Start: 2022-05-05 | End: 2022-05-05

## 2022-05-05 RX ORDER — NAPROXEN 500 MG/1
500 TABLET ORAL 2 TIMES DAILY WITH MEALS
Qty: 14 TABLET | Refills: 0 | Status: SHIPPED | OUTPATIENT
Start: 2022-05-05 | End: 2022-05-12

## 2022-05-05 RX ORDER — FAMOTIDINE 10 MG/ML
20 INJECTION INTRAVENOUS
Status: COMPLETED | OUTPATIENT
Start: 2022-05-05 | End: 2022-05-05

## 2022-05-05 RX ORDER — SODIUM CHLORIDE 9 MG/ML
1000 INJECTION, SOLUTION INTRAVENOUS
Status: COMPLETED | OUTPATIENT
Start: 2022-05-05 | End: 2022-05-05

## 2022-05-05 RX ORDER — ONDANSETRON 8 MG/1
8 TABLET, ORALLY DISINTEGRATING ORAL EVERY 6 HOURS PRN
Qty: 15 TABLET | Refills: 0 | Status: SHIPPED | OUTPATIENT
Start: 2022-05-05

## 2022-05-05 RX ADMIN — SODIUM CHLORIDE 1000 ML: 0.9 INJECTION, SOLUTION INTRAVENOUS at 01:05

## 2022-05-05 RX ADMIN — HYDROMORPHONE HYDROCHLORIDE 0.5 MG: 1 INJECTION, SOLUTION INTRAMUSCULAR; INTRAVENOUS; SUBCUTANEOUS at 01:05

## 2022-05-05 RX ADMIN — FAMOTIDINE 20 MG: 10 INJECTION INTRAVENOUS at 01:05

## 2022-05-05 RX ADMIN — IOHEXOL 100 ML: 350 INJECTION, SOLUTION INTRAVENOUS at 02:05

## 2022-05-05 NOTE — TELEPHONE ENCOUNTER
Caller c/o Abd pain 7/8, vomiting x 5-10 episodes and diarrhea x 5-10 episodes x 2 hours.  Pt advised per protocol and verbalized understanding.    Reason for Disposition   [1] SEVERE pain (e.g., excruciating) AND [2] present > 1 hour    Additional Information   Negative: Shock suspected (e.g., cold/pale/clammy skin, too weak to stand, low BP, rapid pulse)   Negative: Difficult to awaken or acting confused (e.g., disoriented, slurred speech)   Negative: Passed out (i.e., lost consciousness, collapsed and was not responding)   Negative: Sounds like a life-threatening emergency to the triager    Protocols used: ABDOMINAL PAIN - FEMALE-A-AH

## 2022-05-05 NOTE — ED NOTES
Pt arrives to Ed with c/o abdominal pain, nausea, vomitting with onset yesterday. Pt lying in bed, respirations even, unlabored, NAD noted, answering questions appropriately.

## 2022-05-05 NOTE — ED PROVIDER NOTES
Encounter Date: 5/4/2022       History     Chief Complaint   Patient presents with    Abdominal Pain     Epigastric pain with onset yesterday, N/V       26-year-old female with no known past medical history presents complaining of periumbilical pain that has been intermittent since yesterday.  She describes the pain as a dull twisting pain that resolved last night and recurred after eating lunch this afternoon.  She reports associated nausea and vomiting and diarrhea.  She denies any fever, chills, myalgias, or urinary symptoms.  She denies history of pancreatitis, cholelithiasis, gastritis peptic ulcer disease.  She denies any interventions at home.  She denies any previous abdominal surgeries.  She denies any similar pain in the past.        Review of patient's allergies indicates:  No Known Allergies  No past medical history on file.  No past surgical history on file.  No family history on file.     Review of Systems   Constitutional: Negative for chills, fatigue and fever.   Respiratory: Negative for cough and shortness of breath.    Cardiovascular: Negative for chest pain.   Gastrointestinal: Positive for abdominal pain, diarrhea, nausea and vomiting.   Genitourinary: Negative for dysuria, flank pain and frequency.   Musculoskeletal: Negative for back pain and myalgias.   Neurological: Negative for dizziness, weakness and headaches.   All other systems reviewed and are negative.      Physical Exam     Initial Vitals   BP Pulse Resp Temp SpO2   05/04/22 2336 05/04/22 2336 05/04/22 2336 05/04/22 2337 05/04/22 2336   (!) 113/97 78 18 97.8 °F (36.6 °C) 100 %      MAP       --                Physical Exam    Nursing note and vitals reviewed.  Constitutional: She appears well-developed and well-nourished. She is not diaphoretic. No distress.   HENT:   Head: Normocephalic and atraumatic.   Right Ear: External ear normal.   Left Ear: External ear normal.   Nose: Nose normal.   Mouth/Throat: No oropharyngeal exudate.    Eyes: Conjunctivae and EOM are normal. Right eye exhibits no discharge. Left eye exhibits no discharge. No scleral icterus.   Neck: Neck supple. No JVD present.   Normal range of motion.  Cardiovascular: Normal rate, regular rhythm and normal heart sounds. Exam reveals no friction rub.    No murmur heard.  Pulmonary/Chest: Breath sounds normal. No stridor. No respiratory distress. She has no wheezes. She has no rhonchi. She has no rales.   Abdominal: Abdomen is soft. Bowel sounds are normal. She exhibits no distension.   RUQ and epigastric TTP There is no rebound and no guarding.   Musculoskeletal:         General: Normal range of motion.      Cervical back: Normal range of motion and neck supple.     Neurological: She is alert and oriented to person, place, and time. She has normal strength. GCS score is 15. GCS eye subscore is 4. GCS verbal subscore is 5. GCS motor subscore is 6.   Psychiatric: She has a normal mood and affect. Thought content normal.         ED Course   Procedures  Labs Reviewed   CBC W/ AUTO DIFFERENTIAL - Abnormal; Notable for the following components:       Result Value    MCH 25.8 (*)     MCHC 31.2 (*)     Gran % 84.6 (*)     Lymph % 10.6 (*)     All other components within normal limits   COMPREHENSIVE METABOLIC PANEL - Abnormal; Notable for the following components:    Sodium 135 (*)     Glucose 137 (*)     All other components within normal limits   HIV 1 / 2 ANTIBODY    Narrative:     Release to patient->Immediate   HEPATITIS C ANTIBODY    Narrative:     Release to patient->Immediate   URINALYSIS, REFLEX TO URINE CULTURE    Narrative:     Specimen Source->Urine   LIPASE   POCT URINE PREGNANCY          Imaging Results          CT Abdomen Pelvis With Contrast (Final result)  Result time 05/05/22 05:07:19    Final result by Diana Cody MD (05/05/22 05:07:19)                 Impression:      1. Mild hepatosplenomegaly.  2. Normal and prominent mesenteric lymph nodes most pronounced in  the right lower quadrant.  Findings can be seen with mesenteric adenitis in the appropriate clinical setting.  Clinical correlation advised.  3. Moderate volume of fecal material in the right and transverse colon.  Fecalization of distal small bowel contents which can be seen with delayed transit/incompetent ileocecal valve.  4. Additional findings as discussed above.      Electronically signed by: Diana Cody MD  Date:    05/05/2022  Time:    05:07             Narrative:    EXAMINATION:  CT ABDOMEN PELVIS WITH CONTRAST    CLINICAL HISTORY:  Epigastric pain;    TECHNIQUE:  Low dose axial images, sagittal and coronal reformations were obtained from the lung bases to the pubic symphysis following the IV administration of 100 mL of Omnipaque 350 .  Oral contrast was not given.    COMPARISON:  Ultrasound 05/05/2022    FINDINGS:  The visualized lung bases are free of pleural fluid or focal consolidation.  The visualized portions of the heart and pericardium are within normal limits.    The liver is prominent in size without focal abnormality.  No calcified stones are identified in the gallbladder lumen.  No significant intra or extrahepatic biliary ductal dilatation.  The spleen is prominent in size.  The pancreas demonstrates no abnormal adjacent inflammatory change or peripancreatic fluid.  The adrenal glands are unremarkable.    The kidneys are normal in size and location and enhance symmetrically.  There is subcentimeter left renal cortical hypodensities too small to definitively characterize.  There is no hydronephrosis.  The urinary bladder is distended with smooth margins.  The uterus and adnexal regions are within normal limits of in a patient of this chronologic age.    The abdominal aorta is nonaneurysmal.  There are shotty periaortic lymph nodes present.  Additionally there are normal and prominent mesenteric lymph nodes present in the right lower quadrant.    The stomach is nondistended.  The visualized  loops of large and small bowel demonstrate no evidence of obstruction or inflammatory change.  There is fecalization of small bowel contents in the terminal ileum/distal ileum which can be seen with delayed transit/incompetent ileocecal valve.  There is moderate volume of fecal material in the right and transverse colon.  The appendix is unremarkable.  There is no free intraperitoneal air, portal venous gas or ascites.    The visualized osseous structures are intact.  There is a small fat containing umbilical hernia.                               US Abdomen Limited (Final result)  Result time 05/05/22 02:10:00    Final result by Diana Cody MD (05/05/22 02:10:00)                 Impression:      1. No sonographic evidence of discrete cholelithiasis.  2. Mild hepatomegaly.  Findings suggestive of possible hepatic steatosis.  Correlation with LFTs recommended.      Electronically signed by: Diana Cody MD  Date:    05/05/2022  Time:    02:10             Narrative:    EXAMINATION:  US ABDOMEN LIMITED    CLINICAL HISTORY:  ruq pain;    TECHNIQUE:  Limited ultrasound of the right upper quadrant of the abdomen (including pancreas, liver, gallbladder, common bile duct, and spleen) was performed.    COMPARISON:  None.    FINDINGS:  Liver: The liver measures 18.8 cm.  There is slight diffuse attenuation of sound by the liver which can be seen with a diffuse parenchymal process such as fatty infiltration.   No focal hepatic lesions.    Gallbladder: No calculi, wall thickening, or pericholecystic fluid.  No sonographic Frederick's sign.    Biliary system: The common duct is not dilated, measuring 3 mm.  No intrahepatic ductal dilatation.    Spleen: Upper limit of normal size measuring 12.5 cm.    Pancreas: Partially obscured by bowel gas artifact.  Unremarkable in its visualized extent.    IVC: Unremarkable in its visualized extent.    Miscellaneous: No upper abdominal ascites.                                 Medications    0.9%  NaCl infusion (0 mLs Intravenous Stopped 5/5/22 0229)   famotidine (PF) injection 20 mg (20 mg Intravenous Given 5/5/22 0129)   HYDROmorphone injection 0.5 mg (0.5 mg Intravenous Given 5/5/22 0123)   iohexoL (OMNIPAQUE 350) injection 100 mL (100 mLs Intravenous Given 5/5/22 0258)                 ED Course as of 05/05/22 0523   Thu May 05, 2022   0221  On reassessment the patient states her pain is returning after having the ultrasound.  Ultrasound shows no evidence of an acute process.  Labs showed no significant abnormalities.    On repeat abdominal exam she continues to have epigastric  and now left upper quadrant rather than right upper quadrant pain.  Will obtain CT abdomen pelvis for further evaluation. [AA]      ED Course User Index  [AA] Jaqueline Chatman MD             Clinical Impression:   Final diagnoses:  [K59.00] Constipation, unspecified constipation type (Primary)  [R10.11, R10.12] Acute bilateral upper abdominal pain          ED Disposition Condition    Discharge Stable        ED Prescriptions     Medication Sig Dispense Start Date End Date Auth. Provider    ondansetron (ZOFRAN-ODT) 8 MG TbDL Take 1 tablet (8 mg total) by mouth every 6 (six) hours as needed (Nausea). 15 tablet 5/5/2022  Jaqueline Chatman MD    naproxen (NAPROSYN) 500 MG tablet Take 1 tablet (500 mg total) by mouth 2 (two) times daily with meals. for 7 days 14 tablet 5/5/2022 5/12/2022 Jaqueline Chatman MD        Follow-up Information     Follow up With Specialties Details Why Contact Info    [facility] Emergency Dept Emergency Medicine Go to  If symptoms worsen [address]  [facility] [id number] [phone]    Primary care  Schedule an appointment as soon as possible for a visit              Jaqueline Chatman MD  05/05/22 0523

## 2022-05-05 NOTE — DISCHARGE INSTRUCTIONS
Take Zofran as needed for nausea.  Take naproxen as needed for pain.  follow up with her primary care doctor for further evaluation if this pain persists.  Seek immediate re-evaluation if you develop any new or worsening pain, vomiting despite Zofran, fever, or have any other concerns.

## 2023-12-19 ENCOUNTER — EMERGENCY (EMERGENCY)
Facility: HOSPITAL | Age: 28
LOS: 0 days | Discharge: HOME | End: 2023-12-19
Attending: STUDENT IN AN ORGANIZED HEALTH CARE EDUCATION/TRAINING PROGRAM
Payer: MEDICAID

## 2023-12-19 VITALS
TEMPERATURE: 99 F | SYSTOLIC BLOOD PRESSURE: 154 MMHG | WEIGHT: 293 LBS | RESPIRATION RATE: 18 BRPM | OXYGEN SATURATION: 98 % | DIASTOLIC BLOOD PRESSURE: 67 MMHG | HEIGHT: 67 IN | HEART RATE: 91 BPM

## 2023-12-19 DIAGNOSIS — Z76.0 ENCOUNTER FOR ISSUE OF REPEAT PRESCRIPTION: ICD-10-CM

## 2023-12-19 DIAGNOSIS — F31.9 BIPOLAR DISORDER, UNSPECIFIED: ICD-10-CM

## 2023-12-19 DIAGNOSIS — Z88.8 ALLERGY STATUS TO OTHER DRUGS, MEDICAMENTS AND BIOLOGICAL SUBSTANCES: ICD-10-CM

## 2023-12-19 PROCEDURE — 99283 EMERGENCY DEPT VISIT LOW MDM: CPT

## 2023-12-19 PROCEDURE — 99281 EMR DPT VST MAYX REQ PHY/QHP: CPT

## 2023-12-19 RX ORDER — ESCITALOPRAM OXALATE 10 MG/1
1 TABLET, FILM COATED ORAL
Qty: 7 | Refills: 0
Start: 2023-12-19 | End: 2023-12-25

## 2023-12-19 RX ORDER — LAMOTRIGINE 25 MG/1
1 TABLET, ORALLY DISINTEGRATING ORAL
Qty: 7 | Refills: 0
Start: 2023-12-19 | End: 2023-12-25

## 2023-12-19 RX ORDER — ARIPIPRAZOLE 15 MG/1
1 TABLET ORAL
Qty: 7 | Refills: 0
Start: 2023-12-19 | End: 2023-12-25

## 2023-12-19 NOTE — ED PROVIDER NOTE - CARE PROVIDERS DIRECT ADDRESSES
,[email or web address]

## 2023-12-19 NOTE — ED PROVIDER NOTE - OBJECTIVE STATEMENT
27yo F   PMH : Bipolar   Presents for medication refill  [place] and psychiatrist refused to refill psychiatric medications because has not seen patient in 3mo   patient taking lamotrigine 200 qd , aripiprazole 5mg qd and Escitalopram 20mg qd   Patient taking medication daily and ran out yesterday   Currently assymptomatic , no suicidal or homacidal ideation 29yo F   PMH : Bipolar   Presents for medication refill  [place] and psychiatrist refused to refill psychiatric medications because has not seen patient in 3mo   patient taking lamotrigine 200 qd , aripiprazole 5mg qd and Escitalopram 20mg qd   Patient taking medication daily and ran out yesterday   Currently assymptomatic , no suicidal or homacidal ideation

## 2023-12-19 NOTE — ED PROVIDER NOTE - NSFOLLOWUPCLINICS_GEN_ALL_ED_FT
[facility]  OP Mental Health  [address] [place] [address]  Phone: [phone]  Fax:      [facility]  OP Mental Health  [address] [place] [address]  Phone: [phone]  Fax:

## 2023-12-19 NOTE — ED ADULT NURSE NOTE - NSFALLUNIVINTERV_ED_ALL_ED
Bed/Stretcher in lowest position, wheels locked, appropriate side rails in place/Call bell, personal items and telephone in reach/Instruct patient to call for assistance before getting out of bed/chair/stretcher/Non-slip footwear applied when patient is off stretcher/[place] to call system/Physically safe environment - no spills, clutter or unnecessary equipment/Purposeful proactive rounding/Room/bathroom lighting operational, light cord in reach Bed/Stretcher in lowest position, wheels locked, appropriate side rails in place/Call bell, personal items and telephone in reach/Instruct patient to call for assistance before getting out of bed/chair/stretcher/Non-slip footwear applied when patient is off stretcher/[place] to call system/Physically safe environment - no spills, clutter or unnecessary equipment/Purposeful proactive rounding/Room/bathroom lighting operational, light cord in reach

## 2023-12-19 NOTE — ED ADULT NURSE NOTE - BREATHING, MLM
Telephone Encounter by Ashlee George RN at 05/24/17 04:45 PM     Author:  Ashlee George RN Service:  (none) Author Type:  Registered Nurse     Filed:  05/24/17 04:45 PM Encounter Date:  5/22/2017 Status:  Signed     :  Ashlee George RN (Registered Nurse)            Appointment made with Dr. Cabrera for June 1, 2017.[NW1.1M] Electronically Signed by:    Ashlee George RN , 5/24/2017[NW1.1T]        Revision History        User Key Date/Time User Provider Type Action    > NW1.1 05/24/17 04:45 PM Ashlee George RN Registered Nurse Sign    M - Manual, T - Template            
Telephone Encounter by Kerley, Jasmine, RMA at 05/24/17 09:06 AM     Author:  Kerley, Jasmine, RMA Service:  (none) Author Type:  Certified Medical Assistant     Filed:  05/24/17 09:06 AM Encounter Date:  5/22/2017 Status:  Signed     :  Kerley, Jasmine, RMA (Certified Medical Assistant)       From: Kevin Mendes  To: Abdoul Cabrera MD  Sent: 5/22/2017  9:53 PM CDT  Subject: Medication Renewal Request    Original authorizing provider: MD Kevin Early would like a refill of the following medications:  minocycline (MINOCIN,DYNACIN) 100 MG Cap [Abdoul Cabrera MD]    [facility] [address]    Comment:         Revision History        Date/Time User Provider Type Action    > 05/24/17 09:06 AM Kerley, Jasmine, RMA Certified Medical Assistant Sign    Attribution information within the note text is not available.            
Telephone Encounter by Kerley, Jasmine, RMA at 05/24/17 09:07 AM     Author:  Kerley, Jasmine, RMA Service:  (none) Author Type:  Certified Medical Assistant     Filed:  05/24/17 09:08 AM Encounter Date:  5/22/2017 Status:  Signed     :  Kerley, Jasmine, RMA (Certified Medical Assistant)            refill request for minocycline  last visit 8.6.15 for acne  rx denied on 5.8.17 patient needs appointment   patient needs to be seen before refill is given[JK1.1M]  Left message on answering machine to call back.[JK1.1T]  Electronically Signed by:    Jasmine Kerley, RMA , 5/24/2017[JK1.2T]        Revision History        User Key Date/Time User Provider Type Action    > JK1.2 05/24/17 09:08 AM Kerley, Jasmine, RMA Certified Medical Assistant Sign     JK1.1 05/24/17 09:07 AM Kerley, Jasmine, RMA Certified Medical Assistant     M - Manual, T - Template            
Spontaneous, unlabored and symmetrical

## 2023-12-19 NOTE — ED PROVIDER NOTE - PATIENT PORTAL LINK FT
You can access the FollowMyHealth Patient Portal offered by [facility] by registering at the following website: [email or web address]. By joining [employer]’s FollowMyHealth portal, you will also be able to view your health information using other applications (apps) compatible with our system. You can access the FollowMyHealth Patient Portal offered by [facility] by registering at the following website: [email or web address]. By joining [employer]’s FollowMyHealth portal, you will also be able to view your health information using other applications (apps) compatible with our system.

## 2023-12-19 NOTE — ED PROVIDER NOTE - CLINICAL SUMMARY MEDICAL DECISION MAKING FREE TEXT BOX
Patient past medical history as above presents recently [place] from [place] presents for bipolar medication denies any other symptoms will give a weeks worth of medication follow-up outpatient clinic no other medical complaints I agree with exam as above

## 2023-12-19 NOTE — ED PROVIDER NOTE - PLAN OF CARE
Patient had Bipolar disorder and presents for medication refills  Patient taking lamotrigine 200 qd , aripiprazole 5mg qd and Escitalopram 20mg qd  Medications refilled for 1w   Instructed for follow up with behavioural health for further assessment and medication needs.

## 2023-12-26 ENCOUNTER — EMERGENCY (EMERGENCY)
Facility: HOSPITAL | Age: 28
LOS: 0 days | Discharge: HOME | End: 2023-12-26
Attending: STUDENT IN AN ORGANIZED HEALTH CARE EDUCATION/TRAINING PROGRAM
Payer: MEDICAID

## 2023-12-26 VITALS
OXYGEN SATURATION: 99 % | DIASTOLIC BLOOD PRESSURE: 71 MMHG | WEIGHT: 279.99 LBS | HEART RATE: 75 BPM | RESPIRATION RATE: 18 BRPM | TEMPERATURE: 99 F | HEIGHT: 67 IN | SYSTOLIC BLOOD PRESSURE: 127 MMHG

## 2023-12-26 DIAGNOSIS — Z76.0 ENCOUNTER FOR ISSUE OF REPEAT PRESCRIPTION: ICD-10-CM

## 2023-12-26 DIAGNOSIS — F31.9 BIPOLAR DISORDER, UNSPECIFIED: ICD-10-CM

## 2023-12-26 PROCEDURE — 99283 EMERGENCY DEPT VISIT LOW MDM: CPT

## 2023-12-26 PROCEDURE — 99281 EMR DPT VST MAYX REQ PHY/QHP: CPT

## 2023-12-26 RX ORDER — ESCITALOPRAM OXALATE 10 MG/1
1 TABLET, FILM COATED ORAL
Qty: 14 | Refills: 0
Start: 2023-12-26 | End: 2024-01-08

## 2023-12-26 RX ORDER — LAMOTRIGINE 25 MG/1
1 TABLET, ORALLY DISINTEGRATING ORAL
Qty: 14 | Refills: 0
Start: 2023-12-26 | End: 2024-01-08

## 2023-12-26 RX ORDER — ARIPIPRAZOLE 15 MG/1
1 TABLET ORAL
Qty: 14 | Refills: 0
Start: 2023-12-26 | End: 2024-01-08

## 2023-12-26 RX ORDER — LAMOTRIGINE 25 MG/1
1 TABLET, ORALLY DISINTEGRATING ORAL
Qty: 14 | Refills: 0
Start: 2023-12-26 | End: 2024-01-22

## 2023-12-26 RX ORDER — ARIPIPRAZOLE 15 MG/1
1 TABLET ORAL
Qty: 14 | Refills: 0
Start: 2023-12-26 | End: 2024-01-22

## 2023-12-26 RX ORDER — ESCITALOPRAM OXALATE 10 MG/1
1 TABLET, FILM COATED ORAL
Qty: 14 | Refills: 0
Start: 2023-12-26 | End: 2024-01-22

## 2023-12-26 NOTE — ED PROVIDER NOTE - PATIENT PORTAL LINK FT
You can access the FollowMyHealth Patient Portal offered by [facility] by registering at the following website: [email or web address]. By joining [employer]’s FollowMyHealth portal, you will also be able to view your health information using other applications (apps) compatible with our system. You can access the FollowMyHealth Patient Portal offered by [facility] by registering at the following website: [email or web address]. By joining [employer]’s FollowMyHealth portal, you will also be able to view your health information using other applications (apps) compatible with our system.

## 2023-12-26 NOTE — ED PROVIDER NOTE - NSFOLLOWUPCLINICS_GEN_ALL_ED_FT
[facility]  OP Mental Health  [address] [place] [address]  Phone: [phone]  Fax:      [facility]  OP Mental Health  [address] [place] [address]  Phone: [phone]  Fax:

## 2023-12-26 NOTE — ED PROVIDER NOTE - PHYSICAL EXAMINATION
Vital Signs: I have reviewed the initial vital signs.  Constitutional: well-nourished, appears stated age, no acute distress  Head: atraumatic and normocephalic  Eyes: clear conjunctiva  ENT: , MMM  Cardiovascular: regular rate, regular rhythm, well-perfused extremities  Respiratory: unlabored respiratory effort, clear to auscultation bilaterally  psych: no SI/HI , good eye contact, affect clear  Neuro: awake, alert, follows commands, oriented, no focal deficits, GCS 15  ;

## 2023-12-26 NOTE — ED PROVIDER NOTE - OBJECTIVE STATEMENT
27 y/o female presents to the ED for evaluation for medication refill. patient with hx of bipolar disorder. patient with upcoming appt with psychiatrist in 3 weeks. patient last dosage last night . patient on lamotrigine, escitalopram and arpprazole . no SI/HI or hearing voices. no chest pain , sob , palpitations. no vomiting. 29 y/o female presents to the ED for evaluation for medication refill. patient with hx of bipolar disorder. patient with upcoming appt with psychiatrist in 3 weeks. patient last dosage last night . patient on lamotrigine, escitalopram and arpprazole . no SI/HI or hearing voices. no chest pain , sob , palpitations. no vomiting.

## 2023-12-26 NOTE — ED ADULT TRIAGE NOTE - TEMPERATURE IN CELSIUS (DEGREES C)
Patient notified and verbalized understanding.     
Received a fax from [facility] 12/26 (I was out of office) that medication Irbesartan 300 mg tablets is not available until the end of January. Requesting an alternative. I faxed a prescription to [facility] for Losartan 100 mg. Please notify Barbie of this change. Thanks.   
37.2

## 2023-12-26 NOTE — ED ADULT NURSE NOTE - NSFALLUNIVINTERV_ED_ALL_ED
Bed/Stretcher in lowest position, wheels locked, appropriate side rails in place/Call bell, personal items and telephone in reach/Instruct patient to call for assistance before getting out of bed/chair/stretcher/Non-slip footwear applied when patient is off stretcher/[place] to call system/Physically safe environment - no spills, clutter or unnecessary equipment/Purposeful proactive rounding/Room/bathroom lighting operational, light cord in reach Bed/Stretcher in lowest position, wheels locked, appropriate side rails in place/Call bell, personal items and telephone in reach/Instruct patient to call for assistance before getting out of bed/chair/stretcher/Non-slip footwear applied when patient is off stretcher/[place] to call system/Physically safe environment - no spills, clutter or unnecessary equipment/Purposeful proactive rounding/Room/bathroom lighting operational, light cord in reach

## 2023-12-26 NOTE — ED ADULT TRIAGE NOTE - CHIEF COMPLAINT QUOTE
Patient requesting psych med refill. Patient seen last week for same reason and was prescribed 1 weeks worth

## 2023-12-26 NOTE — ED ADULT NURSE NOTE - NSFALLOOBATTEMPT_ED_ALL_ED
1. Allergic reaction to insect bite  -     triamcinolone (KENALOG) 0.1 % cream; Apply topically 2 (two) times a day
No

## 2023-12-26 NOTE — ED PROVIDER NOTE - ATTENDING APP SHARED VISIT CONTRIBUTION OF CARE
27 y/o female presents to the ED for evaluation for medication refill. patient with hx of bipolar disorder. patient with upcoming appt with psychiatrist in 3 weeks. patient last dosage last night . patient on lamotrigine, escitalopram and arpprazole .  pt was in ED last week got med refill and ran out last night. pt denies hi/si/hallucinations. pt's therapist told her to come to ED for refill as she does not have another option at this point. no medical complaints    vss  gen- NAD, aaox3  card-rrr  lungs-ctab, no wheezing or rhonchi  neuro- full str/sensation, cn ii-xii grossly intact, normal coordination and gait 29 y/o female presents to the ED for evaluation for medication refill. patient with hx of bipolar disorder. patient with upcoming appt with psychiatrist in 3 weeks. patient last dosage last night . patient on lamotrigine, escitalopram and arpprazole .  pt was in ED last week got med refill and ran out last night. pt denies hi/si/hallucinations. pt's therapist told her to come to ED for refill as she does not have another option at this point. no medical complaints    vss  gen- NAD, aaox3  card-rrr  lungs-ctab, no wheezing or rhonchi  neuro- full str/sensation, cn ii-xii grossly intact, normal coordination and gait

## 2023-12-26 NOTE — ED PROVIDER NOTE - CLINICAL SUMMARY MEDICAL DECISION MAKING FREE TEXT BOX
Throughout ED observation period, pt remained clinically and hemodynamically stable.  will give 2 week med refill, discussed return precautions and to continue to try to get closer f/u

## 2024-01-01 ENCOUNTER — TRANSCRIPTION ENCOUNTER (OUTPATIENT)
Age: 29
End: 2024-01-01

## 2024-01-09 ENCOUNTER — EMERGENCY (EMERGENCY)
Facility: HOSPITAL | Age: 29
LOS: 0 days | Discharge: HOME | End: 2024-01-09
Attending: EMERGENCY MEDICINE
Payer: MEDICAID

## 2024-01-09 VITALS
HEIGHT: 67 IN | RESPIRATION RATE: 20 BRPM | OXYGEN SATURATION: 98 % | HEART RATE: 80 BPM | DIASTOLIC BLOOD PRESSURE: 64 MMHG | SYSTOLIC BLOOD PRESSURE: 117 MMHG | WEIGHT: 293 LBS | TEMPERATURE: 98 F

## 2024-01-09 DIAGNOSIS — Z76.0 ENCOUNTER FOR ISSUE OF REPEAT PRESCRIPTION: ICD-10-CM

## 2024-01-09 DIAGNOSIS — F31.9 BIPOLAR DISORDER, UNSPECIFIED: ICD-10-CM

## 2024-01-09 DIAGNOSIS — Z88.8 ALLERGY STATUS TO OTHER DRUGS, MEDICAMENTS AND BIOLOGICAL SUBSTANCES: ICD-10-CM

## 2024-01-09 PROCEDURE — 99283 EMERGENCY DEPT VISIT LOW MDM: CPT

## 2024-01-09 PROCEDURE — 99281 EMR DPT VST MAYX REQ PHY/QHP: CPT

## 2024-01-09 NOTE — ED PROVIDER NOTE - PHYSICAL EXAMINATION
Vital Signs: I have reviewed the initial vital signs.  Constitutional: well-nourished, no acute distress  Musculoskeletal: neck supple, pelvis stable, good peripheral pulses  Integumentary: (+warm dry in tact  Neurologic: awake, alert, extremities’ motor and sensory functions grossly intact, no focal deficits  eyes: perrla, eomi

## 2024-01-09 NOTE — ED ADULT NURSE NOTE - NSFALLUNIVINTERV_ED_ALL_ED
Bed/Stretcher in lowest position, wheels locked, appropriate side rails in place/Call bell, personal items and telephone in reach/Instruct patient to call for assistance before getting out of bed/chair/stretcher/Non-slip footwear applied when patient is off stretcher/[place] to call system/Physically safe environment - no spills, clutter or unnecessary equipment/Purposeful proactive rounding/Room/bathroom lighting operational, light cord in reach Bed/Stretcher in lowest position, wheels locked, appropriate side rails in place/Call bell, personal items and telephone in reach/Instruct patient to call for assistance before getting out of bed/chair/stretcher/Non-slip footwear applied when patient is off stretcher/[place] to call system/Physically safe environment - no spills, clutter or unnecessary equipment/Purposeful proactive rounding/Room/bathroom lighting operational, light cord in reach

## 2024-01-09 NOTE — ED PROVIDER NOTE - OBJECTIVE STATEMENT
29 y/o female presents to the ED for evaluation for medication refill. patient with hx of bipolar disorder. patient with upcoming appt with psychiatrist in 2 weeks. patient last dosage last night . patient on lamotrigine, escitalopram and arpprazole . no SI/HI or hearing voices. no chest pain , sob , palpitations. no vomiting.

## 2024-01-09 NOTE — ED PROVIDER NOTE - PATIENT PORTAL LINK FT
You can access the FollowMyHealth Patient Portal offered by [facility] by registering at the following website: [email or web address]. By joining [employer]’s FollowMyHealth portal, you will also be able to view your health information using other applications (apps) compatible with our system. You can access the FollowMyHealth Patient Portal offered by [facility] by registering at the following website: [email or web address]. By joining [email or web address]’s FollowMyHealth portal, you will also be able to view your health information using other applications (apps) compatible with our system.

## 2024-01-09 NOTE — ED PROVIDER NOTE - CLINICAL SUMMARY MEDICAL DECISION MAKING FREE TEXT BOX
28yF presents for medical refill for her psych meds - has appointment in 2 weeks. no si hi. feels well.  meds prescribed

## 2024-01-09 NOTE — ED ADULT TRIAGE NOTE - SPO2 (%)
SBAR IN Report: Mother Verbal report received from Kady Orozco RN on this patient, who is now being transferred from L&D for routine progression of care. The patient is not wearing a green \"Anesthesia-Duramorph\" band. Report consisted of patient's Situation, Background, Assessment and Recommendations (SBAR). [place] ID bands were compared with the identification form, and verified with the patient and transferring nurse. Information from the SBAR and the Rosa Report was reviewed with the transferring nurse; opportunity for questions and clarification provided. 98

## 2024-01-09 NOTE — ED PROVIDER NOTE - NSFOLLOWUPINSTRUCTIONS_ED_ALL_ED_FT
Bipolar 1 Disorder  Bipolar 1 disorder is a mental health disorder in which a person has episodes of emotional highs (tori), and may also have episodes of emotional lows (depression) in addition to highs. Bipolar 1 disorder is different from other bipolar disorders because it involves extreme manic episodes. These episodes last at least one week or involve symptoms that are so severe that hospitalization is needed to keep the person safe.    What increases the risk?  The cause of this condition is not known. However, certain factors make you more likely to have bipolar disorder, such as:    Having a family member with the disorder.  An imbalance of certain chemicals in the brain (neurotransmitters).  Stress, such as illness, financial problems, or a death.  Certain conditions that affect the brain or spinal cord (neurologic conditions).  Brain injury (trauma).  Having another mental health disorder, such as:    Obsessive compulsive disorder.  Schizophrenia.      What are the signs or symptoms?  Symptoms of tori include:    Very high self-esteem or self-confidence.  Decreased need for sleep.  Unusual talkativeness or feeling a need to keep talking. Speech may be very fast. It may seem like you cannot stop talking.  Racing thoughts or constant talking, with quick shifts between topics that may or may not be related (flight of ideas).  Decreased ability to focus or concentrate.  Increased purposeful activity, such as work, studies, or social activity.  Increased nonproductive activity. This could be pacing, squirming and fidgeting, or finger and toe tapping.  Impulsive behavior and poor judgment. This may result in high-risk activities, such as having unprotected sex or spending a lot of money.    Symptoms of depression include:    Feeling sad, hopeless, or helpless.  Frequent or uncontrollable crying.  Lack of feeling or caring about anything.  Sleeping too much.  Moving more slowly than usual.  Not being able to enjoy things you used to enjoy.  Wanting to be alone all the time.  Feeling guilty or worthless.  Lack of energy or motivation.  Trouble concentrating or remembering.  Trouble making decisions.  Increased appetite.  Thoughts of death, or the desire to harm yourself.    Sometimes, you may have a mixed mood. This means having symptoms of depression and tori. Stress can make symptoms worse.    How is this diagnosed?  To diagnose bipolar disorder, your health care provider may ask about your:    Emotional episodes.  Medical history.  Alcohol and drug use. This includes prescription medicines. Certain medical conditions and substances can cause symptoms that seem like bipolar disorder (secondary bipolar disorder).    How is this treated?  ImageBipolar disorder is a long-term (chronic) illness. It is best controlled with ongoing (continuous) treatment rather than treatment only when symptoms occur. Treatment may include:    Medicine. Medicine can be prescribed by a provider who specializes in treating mental disorders (psychiatrist).    Medicines called mood stabilizers are usually prescribed.  If symptoms occur even while taking a mood stabilizer, other medicines may be added.    Psychotherapy. Some forms of talk therapy, such as cognitive-behavioral therapy (CBT), can provide support, education, and guidance.  Coping methods, such as journaling or relaxation exercises. These may include:    Yoga.  Meditation.  Deep breathing.    Lifestyle changes, such as:    Limiting alcohol and drug use.  Exercising regularly.  Getting plenty of sleep.  Making healthy eating choices.      A combination of medicine, talk therapy, and coping methods is best. A procedure in which electricity is applied to the brain through the scalp (electroconvulsive therapy) may be used in cases of severe tori when medicine and psychotherapy work too slowly or do not work.    Follow these instructions at home:  Activity     Image   Return to your normal activities as told by your health care provider.  Find activities that you enjoy, and make time to do them.  Exercise regularly as told by your health care provider.  Lifestyle     Limit alcohol intake to no more than 1 drink a day for nonpregnant women and 2 drinks a day for men. One drink equals 12 oz of beer, 5 oz of wine, or 1½ oz of hard liquor.  Follow a set schedule for eating and sleeping.  Eat a balanced diet that includes fresh fruits and vegetables, whole grains, low-fat dairy, and lean meat.  Get 7–8 hours of sleep each night.  General instructions     Take over-the-counter and prescription medicines only as told by your health care provider.  Think about joining a support group. Your health care provider may be able to recommend a support group.  Talk with your family and loved ones about your treatment goals and how they can help.  Keep all follow-up visits as told by your health care provider. This is important.  Where to find more information  For more information about bipolar disorder, visit the following websites:    National [place] on Mental Illness: www.fitz.org  [place] National [place] of Mental Health: www.nimh.nih.gov    Contact a health care provider if:  Your symptoms get worse.  You have side effects from your medicine, and they get worse.  You have trouble sleeping.  You have trouble doing daily activities.  You feel unsafe in your surroundings.  You are dealing with substance abuse.  Get help right away if:  You have new symptoms.  You have thoughts about harming yourself.  You self-harm.  This information is not intended to replace advice given to you by your health care provider. Make sure you discuss any questions you have with your health care provider. Bipolar 1 Disorder  Bipolar 1 disorder is a mental health disorder in which a person has episodes of emotional highs (tori), and may also have episodes of emotional lows (depression) in addition to highs. Bipolar 1 disorder is different from other bipolar disorders because it involves extreme manic episodes. These episodes last at least one week or involve symptoms that are so severe that hospitalization is needed to keep the person safe.    What increases the risk?  The cause of this condition is not known. However, certain factors make you more likely to have bipolar disorder, such as:    Having a family member with the disorder.  An imbalance of certain chemicals in the brain (neurotransmitters).  Stress, such as illness, financial problems, or a death.  Certain conditions that affect the brain or spinal cord (neurologic conditions).  Brain injury (trauma).  Having another mental health disorder, such as:    Obsessive compulsive disorder.  Schizophrenia.      What are the signs or symptoms?  Symptoms of tori include:    Very high self-esteem or self-confidence.  Decreased need for sleep.  Unusual talkativeness or feeling a need to keep talking. Speech may be very fast. It may seem like you cannot stop talking.  Racing thoughts or constant talking, with quick shifts between topics that may or may not be related (flight of ideas).  Decreased ability to focus or concentrate.  Increased purposeful activity, such as work, studies, or social activity.  Increased nonproductive activity. This could be pacing, squirming and fidgeting, or finger and toe tapping.  Impulsive behavior and poor judgment. This may result in high-risk activities, such as having unprotected sex or spending a lot of money.    Symptoms of depression include:    Feeling sad, hopeless, or helpless.  Frequent or uncontrollable crying.  Lack of feeling or caring about anything.  Sleeping too much.  Moving more slowly than usual.  Not being able to enjoy things you used to enjoy.  Wanting to be alone all the time.  Feeling guilty or worthless.  Lack of energy or motivation.  Trouble concentrating or remembering.  Trouble making decisions.  Increased appetite.  Thoughts of death, or the desire to harm yourself.    Sometimes, you may have a mixed mood. This means having symptoms of depression and tori. Stress can make symptoms worse.    How is this diagnosed?  To diagnose bipolar disorder, your health care provider may ask about your:    Emotional episodes.  Medical history.  Alcohol and drug use. This includes prescription medicines. Certain medical conditions and substances can cause symptoms that seem like bipolar disorder (secondary bipolar disorder).    How is this treated?  ImageBipolar disorder is a long-term (chronic) illness. It is best controlled with ongoing (continuous) treatment rather than treatment only when symptoms occur. Treatment may include:    Medicine. Medicine can be prescribed by a provider who specializes in treating mental disorders (psychiatrist).    Medicines called mood stabilizers are usually prescribed.  If symptoms occur even while taking a mood stabilizer, other medicines may be added.    Psychotherapy. Some forms of talk therapy, such as cognitive-behavioral therapy (CBT), can provide support, education, and guidance.  Coping methods, such as journaling or relaxation exercises. These may include:    Yoga.  Meditation.  Deep breathing.    Lifestyle changes, such as:    Limiting alcohol and drug use.  Exercising regularly.  Getting plenty of sleep.  Making healthy eating choices.      A combination of medicine, talk therapy, and coping methods is best. A procedure in which electricity is applied to the brain through the scalp (electroconvulsive therapy) may be used in cases of severe tori when medicine and psychotherapy work too slowly or do not work.    Follow these instructions at home:  Activity     Image   Return to your normal activities as told by your health care provider.  Find activities that you enjoy, and make time to do them.  Exercise regularly as told by your health care provider.  Lifestyle     Limit alcohol intake to no more than 1 drink a day for nonpregnant women and 2 drinks a day for men. One drink equals 12 oz of beer, 5 oz of wine, or 1½ oz of hard liquor.  Follow a set schedule for eating and sleeping.  Eat a balanced diet that includes fresh fruits and vegetables, whole grains, low-fat dairy, and lean meat.  Get 7–8 hours of sleep each night.  General instructions     Take over-the-counter and prescription medicines only as told by your health care provider.  Think about joining a support group. Your health care provider may be able to recommend a support group.  Talk with your family and loved ones about your treatment goals and how they can help.  Keep all follow-up visits as told by your health care provider. This is important.  Where to find more information  For more information about bipolar disorder, visit the following websites:    National [place] on Mental Illness: www.fitz.org  U[place] National [place] of Mental Health: www.nimh.nih.gov    Contact a health care provider if:  Your symptoms get worse.  You have side effects from your medicine, and they get worse.  You have trouble sleeping.  You have trouble doing daily activities.  You feel unsafe in your surroundings.  You are dealing with substance abuse.  Get help right away if:  You have new symptoms.  You have thoughts about harming yourself.  You self-harm.  This information is not intended to replace advice given to you by your health care provider. Make sure you discuss any questions you have with your health care provider.

## 2024-06-13 ENCOUNTER — APPOINTMENT (OUTPATIENT)
Dept: OBGYN | Facility: CLINIC | Age: 29
End: 2024-06-13
Payer: MEDICAID

## 2024-06-13 VITALS — HEIGHT: 67 IN | BODY MASS INDEX: 45.99 KG/M2 | WEIGHT: 293 LBS

## 2024-06-13 DIAGNOSIS — N92.6 IRREGULAR MENSTRUATION, UNSPECIFIED: ICD-10-CM

## 2024-06-13 PROCEDURE — 99203 OFFICE O/P NEW LOW 30 MIN: CPT

## 2024-06-13 RX ORDER — MEDROXYPROGESTERONE ACETATE 10 MG/1
10 TABLET ORAL
Qty: 10 | Refills: 0 | Status: ACTIVE | COMMUNITY
Start: 2024-06-13 | End: 1900-01-01

## 2024-06-13 NOTE — PLAN
[FreeTextEntry1] : DEVIKA PEREZ is a 28 year female for bleeding not anemic pcos  provera 10 mg x 10 days.

## 2024-07-07 ENCOUNTER — EMERGENCY (EMERGENCY)
Facility: HOSPITAL | Age: 29
LOS: 0 days | Discharge: HOME | End: 2024-07-07
Attending: EMERGENCY MEDICINE
Payer: MEDICAID

## 2024-07-07 VITALS
SYSTOLIC BLOOD PRESSURE: 164 MMHG | TEMPERATURE: 99 F | HEART RATE: 84 BPM | OXYGEN SATURATION: 98 % | WEIGHT: 240.08 LBS | RESPIRATION RATE: 18 BRPM | DIASTOLIC BLOOD PRESSURE: 78 MMHG

## 2024-07-07 DIAGNOSIS — Z76.0 ENCOUNTER FOR ISSUE OF REPEAT PRESCRIPTION: ICD-10-CM

## 2024-07-07 DIAGNOSIS — Z88.8 ALLERGY STATUS TO OTHER DRUGS, MEDICAMENTS AND BIOLOGICAL SUBSTANCES: ICD-10-CM

## 2024-07-07 DIAGNOSIS — F41.9 ANXIETY DISORDER, UNSPECIFIED: ICD-10-CM

## 2024-07-07 DIAGNOSIS — F32.A DEPRESSION, UNSPECIFIED: ICD-10-CM

## 2024-07-07 PROCEDURE — 99281 EMR DPT VST MAYX REQ PHY/QHP: CPT

## 2024-07-07 PROCEDURE — 99283 EMERGENCY DEPT VISIT LOW MDM: CPT

## 2024-07-07 RX ORDER — VENLAFAXINE 37.5 MG/1
1 CAPSULE, EXTENDED RELEASE ORAL
Qty: 14 | Refills: 0
Start: 2024-07-07 | End: 2024-07-20

## 2024-07-21 ENCOUNTER — EMERGENCY (EMERGENCY)
Facility: HOSPITAL | Age: 29
LOS: 0 days | Discharge: HOME | End: 2024-07-21
Attending: EMERGENCY MEDICINE
Payer: MEDICAID

## 2024-07-21 VITALS
WEIGHT: 293 LBS | HEART RATE: 99 BPM | SYSTOLIC BLOOD PRESSURE: 167 MMHG | OXYGEN SATURATION: 99 % | DIASTOLIC BLOOD PRESSURE: 98 MMHG | TEMPERATURE: 99 F | RESPIRATION RATE: 18 BRPM

## 2024-07-21 DIAGNOSIS — F32.A DEPRESSION, UNSPECIFIED: ICD-10-CM

## 2024-07-21 DIAGNOSIS — Z76.0 ENCOUNTER FOR ISSUE OF REPEAT PRESCRIPTION: ICD-10-CM

## 2024-07-21 DIAGNOSIS — Z88.8 ALLERGY STATUS TO OTHER DRUGS, MEDICAMENTS AND BIOLOGICAL SUBSTANCES: ICD-10-CM

## 2024-07-21 DIAGNOSIS — F41.9 ANXIETY DISORDER, UNSPECIFIED: ICD-10-CM

## 2024-07-21 DIAGNOSIS — F31.9 BIPOLAR DISORDER, UNSPECIFIED: ICD-10-CM

## 2024-07-21 DIAGNOSIS — F43.10 POST-TRAUMATIC STRESS DISORDER, UNSPECIFIED: ICD-10-CM

## 2024-07-21 PROCEDURE — 99281 EMR DPT VST MAYX REQ PHY/QHP: CPT

## 2024-07-21 PROCEDURE — 99283 EMERGENCY DEPT VISIT LOW MDM: CPT

## 2024-07-21 RX ORDER — VENLAFAXINE 37.5 MG/1
1 CAPSULE, EXTENDED RELEASE ORAL
Qty: 7 | Refills: 0
Start: 2024-07-21 | End: 2024-07-27

## 2024-07-21 NOTE — ED PROVIDER NOTE - PATIENT PORTAL LINK FT
You can access the FollowMyHealth Patient Portal offered by [facility] by registering at the following website: [email or web address]. By joining [employer]’s FollowMyHealth portal, you will also be able to view your health information using other applications (apps) compatible with our system.

## 2024-07-21 NOTE — ED ADULT NURSE NOTE - NSFALLUNIVINTERV_ED_ALL_ED
Bed/Stretcher in lowest position, wheels locked, appropriate side rails in place/Call bell, personal items and telephone in reach/Instruct patient to call for assistance before getting out of bed/chair/stretcher/Non-slip footwear applied when patient is off stretcher/[place] to call system/Physically safe environment - no spills, clutter or unnecessary equipment/Purposeful proactive rounding/Room/bathroom lighting operational, light cord in reach

## 2024-07-21 NOTE — ED PROVIDER NOTE - CLINICAL SUMMARY MEDICAL DECISION MAKING FREE TEXT BOX
28-year-old female, history of anxiety/depression, PTSD, here for medication refill.  Unremarkable exam.  Given prescription of Effexor and Seroquel for 1 week.  Patient has an appointment with psychiatrist in 5 days.

## 2024-07-21 NOTE — ED PROVIDER NOTE - ATTENDING APP SHARED VISIT CONTRIBUTION OF CARE
28-year-old female, history of anxiety/depression, PTSD, here for medication refill.  No SI/HI.  Exam unremarkable.

## 2024-07-21 NOTE — ED PROVIDER NOTE - OBJECTIVE STATEMENT
28-year-old female with a past medical history of bipolar disorder and depression presents to the ED requesting medication refill.  Patient reports she follows a psychiatrist at the [facility] and is prescribed Effexor, Seroquel, Lamictal, and BuSpar.  Reports she has been unable to get a refill on her medication because there has been an issue with the medication.  Patient reports she has an appointment with a new psychiatrist Dr. An on July 26.  Patient reports she runs out of her Effexor and Seroquel tomorrow and just will need the medication to hold her over until she sees her psychiatrist at the end of the week.  Patient reports she is taking her medication as prescribed.  Patient denies suicidal or homicidal ideations, visual or auditory hallucinations, illicit drug use, cigarette smoking, headache, dizziness, weakness, shaking, chest pain, loss of breath, fever, abdominal pain, nausea, vomiting, diarrhea, or constipation.

## 2024-07-21 NOTE — ED PROVIDER NOTE - CARE PROVIDER_API CALL
Child accompanied by mother  Lives with mother and father  Smoker in house: NO,   Mothers work status: full time, home for summer  Child with: father and grandmother  DIET:       Milk: whole and formula       Frequency: 20/24 oz / day       Appetite: good  STOOLS: normal  IMMUNIZATION REACTIONS: NO  GROWTH & DEVELOPMENT       Plays pat-a-cake - YES       Thumb, finger grasp - YES       Imitates speech sounds - YES       Stands holding on - YES       Holds cup - YES       Says ma-ma, da-da - YES  CONCERNS:milk transition  Shaking of head about 3 days after getting vaccines when taking bottle.  Medications verified, no changes.  Denies known Latex allergy or symptoms of Latex sensitivity.    Patient would like communication of their results via:        Cell Phone:   Telephone Information:   Mobile [phone]   Mobile [phone]     Okay to leave a message containing results? Yes       [facility],   Phone: (   )    -  Fax: (   )    -  Scheduled Appointment: 07/25/2024

## 2024-09-17 ENCOUNTER — NON-APPOINTMENT (OUTPATIENT)
Age: 29
End: 2024-09-17

## 2024-10-25 NOTE — ED PROVIDER NOTE - CARE PLAN
Symptoms previously well-controlled with above regimen.  She has been out of these medications for a while.  We will refill today   Principal Discharge DX:	Medication refill   1 Principal Discharge DX:	Medication refill  Assessment and plan of treatment:	Patient had Bipolar disorder and presents for medication refills  Patient taking lamotrigine 200 qd , aripiprazole 5mg qd and Escitalopram 20mg qd  Medications refilled for 1w   Instructed for follow up with behavioural health for further assessment and medication needs.

## 2025-01-04 ENCOUNTER — NON-APPOINTMENT (OUTPATIENT)
Age: 30
End: 2025-01-04

## 2025-01-27 NOTE — ED ADULT NURSE NOTE - NS ED BHA CANNABIS
[FreeTextEntry1] : 11/23/24: WBC 22.43 Hgb 12.5 Hct 37.8 Plt 227 Na 131 K 4.0 BUN 14 Cr 0.80 None known

## 2025-05-15 ENCOUNTER — NON-APPOINTMENT (OUTPATIENT)
Age: 30
End: 2025-05-15

## 2025-05-16 ENCOUNTER — NON-APPOINTMENT (OUTPATIENT)
Age: 30
End: 2025-05-16